# Patient Record
Sex: FEMALE | Race: WHITE
[De-identification: names, ages, dates, MRNs, and addresses within clinical notes are randomized per-mention and may not be internally consistent; named-entity substitution may affect disease eponyms.]

---

## 2018-04-18 ENCOUNTER — HOSPITAL ENCOUNTER (OUTPATIENT)
Dept: HOSPITAL 77 - KA.SDS | Age: 73
Discharge: HOME | End: 2018-04-18
Attending: FAMILY MEDICINE
Payer: MEDICARE

## 2018-04-18 DIAGNOSIS — Z79.899: ICD-10-CM

## 2018-04-18 DIAGNOSIS — K57.30: Primary | ICD-10-CM

## 2018-04-18 DIAGNOSIS — R73.03: ICD-10-CM

## 2018-04-18 DIAGNOSIS — Z79.84: ICD-10-CM

## 2018-04-18 DIAGNOSIS — I10: ICD-10-CM

## 2018-04-18 DIAGNOSIS — Z79.82: ICD-10-CM

## 2018-04-18 LAB
CHLORIDE SERPL-SCNC: 88 MMOL/L (ref 98–115)
SODIUM SERPL-SCNC: 130 MMOL/L (ref 136–145)

## 2018-04-18 NOTE — PCM.PRNOTE
- Free Text/Narrative


Note: 


INFORMED CONSENT:  Patient is here today for elective colonoscopy.  All aspects 

of this procedure have been discussed with the patient.  All possible 

complications also, including possibility of perforation, infection, pain, 

bleeding and unknown complications.  In the event of perforation patient may 

need to have abdominal exploration, colon resection, colostomy and even death 

was discussed.  Anesthetic complications were handled by anesthesia department.

  The patient understands fully well.  Patient did not have any further 

questions for me at the end of my interview.  The patient wishes for me to 

proceed.





PREOPERATIVE DIAGNOSIS/INDICATIONS:  [Positive: Regard test]





POSTOPERATIVE DIAGNOSIS:  [A few diverticuli present otherwise negative 

colonoscopy]





INSTRUMENT USED: Olympus videocolonoscope.    





ASA CLASSIFICATION: [2]      





ANESTHESIA:  Continuous EKG, oximetry and intermittent blood pressure and 

respiratory monitoring were performed throughout the procedure.  IV Versed and 

Fentanyl were administered. 





PROCEDURE PERFORMED:   Colonoscopy


POSITIONS OF PATIENT: Left lateral.


RECTUM:  Normal.


SIGMOID COLON:  Normal except for a few diverticuli. 


DESCENDING COLON:  Normal. 


SPLENIC FLEXURE:  Normal. 


TRANSVERSE COLON: Normal. 


HEPATIC FLEXURE:  Normal. 


ASCENDING COLON: Normal. 


CECUM:  Normal. 


ILEOCECAL VALVE:  Normal. 


BIOPSY:  None. 


TOLERANCE:  Excellent. 


COMPLICATIONS:  None.